# Patient Record
Sex: MALE | Race: BLACK OR AFRICAN AMERICAN | Employment: FULL TIME
[De-identification: names, ages, dates, MRNs, and addresses within clinical notes are randomized per-mention and may not be internally consistent; named-entity substitution may affect disease eponyms.]

---

## 2017-01-27 ENCOUNTER — OFFICE VISIT (OUTPATIENT)
Dept: FAMILY MEDICINE CLINIC | Facility: CLINIC | Age: 27
End: 2017-01-27

## 2017-01-27 VITALS
TEMPERATURE: 97.5 F | WEIGHT: 202.2 LBS | HEART RATE: 78 BPM | DIASTOLIC BLOOD PRESSURE: 80 MMHG | SYSTOLIC BLOOD PRESSURE: 130 MMHG | HEIGHT: 73 IN | BODY MASS INDEX: 26.8 KG/M2 | RESPIRATION RATE: 17 BRPM

## 2017-01-27 DIAGNOSIS — Z23 NEED FOR TDAP VACCINATION: ICD-10-CM

## 2017-01-27 DIAGNOSIS — Z11.4 SCREENING FOR HIV (HUMAN IMMUNODEFICIENCY VIRUS): ICD-10-CM

## 2017-01-27 DIAGNOSIS — Z00.00 ROUTINE GENERAL MEDICAL EXAMINATION AT HEALTH CARE FACILITY: Primary | ICD-10-CM

## 2017-01-27 DIAGNOSIS — A63.0 GENITAL WARTS: ICD-10-CM

## 2017-01-27 PROCEDURE — 99395 PREV VISIT EST AGE 18-39: CPT | Performed by: NURSE PRACTITIONER

## 2017-01-27 PROCEDURE — 90471 IMMUNIZATION ADMIN: CPT | Performed by: NURSE PRACTITIONER

## 2017-01-27 PROCEDURE — 17110 DESTRUCTION B9 LES UP TO 14: CPT | Performed by: NURSE PRACTITIONER

## 2017-01-27 PROCEDURE — 90715 TDAP VACCINE 7 YRS/> IM: CPT | Performed by: NURSE PRACTITIONER

## 2017-01-27 ASSESSMENT — ENCOUNTER SYMPTOMS
RHINORRHEA: 0
CONSTIPATION: 0
EYE DISCHARGE: 0
DIARRHEA: 0
BACK PAIN: 0
EYE PAIN: 0
VOMITING: 0
ABDOMINAL PAIN: 0
SORE THROAT: 0
WHEEZING: 0
NAUSEA: 0
TROUBLE SWALLOWING: 0
COUGH: 0
SHORTNESS OF BREATH: 0

## 2017-01-27 ASSESSMENT — PATIENT HEALTH QUESTIONNAIRE - PHQ9
1. LITTLE INTEREST OR PLEASURE IN DOING THINGS: 0
SUM OF ALL RESPONSES TO PHQ9 QUESTIONS 1 & 2: 0
SUM OF ALL RESPONSES TO PHQ QUESTIONS 1-9: 0
2. FEELING DOWN, DEPRESSED OR HOPELESS: 0

## 2017-01-29 PROBLEM — A63.0 GENITAL WARTS: Status: ACTIVE | Noted: 2017-01-29

## 2017-02-07 ENCOUNTER — TELEPHONE (OUTPATIENT)
Dept: FAMILY MEDICINE CLINIC | Facility: CLINIC | Age: 27
End: 2017-02-07

## 2017-02-07 ENCOUNTER — OFFICE VISIT (OUTPATIENT)
Dept: FAMILY MEDICINE CLINIC | Facility: CLINIC | Age: 27
End: 2017-02-07

## 2017-02-07 VITALS
BODY MASS INDEX: 27.82 KG/M2 | HEART RATE: 80 BPM | SYSTOLIC BLOOD PRESSURE: 114 MMHG | WEIGHT: 208 LBS | DIASTOLIC BLOOD PRESSURE: 64 MMHG | RESPIRATION RATE: 16 BRPM | TEMPERATURE: 98.2 F

## 2017-02-07 DIAGNOSIS — Z20.6 CONTACT WITH HIV: Primary | ICD-10-CM

## 2017-02-07 DIAGNOSIS — J00 ACUTE NASOPHARYNGITIS: Primary | ICD-10-CM

## 2017-02-07 DIAGNOSIS — Z20.6 CONTACT WITH HIV: ICD-10-CM

## 2017-02-07 PROCEDURE — 99213 OFFICE O/P EST LOW 20 MIN: CPT | Performed by: NURSE PRACTITIONER

## 2017-02-07 RX ORDER — FLUTICASONE PROPIONATE 50 MCG
1 SPRAY, SUSPENSION (ML) NASAL DAILY
Qty: 1 BOTTLE | Refills: 0 | COMMUNITY
Start: 2017-02-07 | End: 2018-08-15 | Stop reason: SDUPTHER

## 2017-02-07 ASSESSMENT — ENCOUNTER SYMPTOMS
EYES NEGATIVE: 1
CHEST TIGHTNESS: 0
SORE THROAT: 1
VOMITING: 0
COUGH: 1
NAUSEA: 0
ABDOMINAL PAIN: 0
DIARRHEA: 0
SHORTNESS OF BREATH: 0
SINUS PRESSURE: 1
RHINORRHEA: 1
VOICE CHANGE: 1

## 2017-04-03 ENCOUNTER — TELEPHONE (OUTPATIENT)
Dept: FAMILY MEDICINE CLINIC | Age: 27
End: 2017-04-03

## 2017-04-04 ENCOUNTER — HOSPITAL ENCOUNTER (EMERGENCY)
Age: 27
End: 2017-04-04

## 2017-04-04 DIAGNOSIS — Z51.89 VISIT FOR WOUND CHECK: Primary | ICD-10-CM

## 2017-04-04 PROCEDURE — 81003 URINALYSIS AUTO W/O SCOPE: CPT

## 2017-10-30 ENCOUNTER — OFFICE VISIT (OUTPATIENT)
Dept: FAMILY MEDICINE CLINIC | Age: 27
End: 2017-10-30

## 2017-10-30 VITALS
HEIGHT: 73 IN | DIASTOLIC BLOOD PRESSURE: 78 MMHG | WEIGHT: 215 LBS | BODY MASS INDEX: 28.49 KG/M2 | HEART RATE: 72 BPM | OXYGEN SATURATION: 98 % | TEMPERATURE: 96.9 F | RESPIRATION RATE: 20 BRPM | SYSTOLIC BLOOD PRESSURE: 123 MMHG

## 2017-10-30 DIAGNOSIS — A63.0 GENITAL WARTS: ICD-10-CM

## 2017-10-30 DIAGNOSIS — J06.9 URI, ACUTE: Primary | ICD-10-CM

## 2017-10-30 PROCEDURE — 99213 OFFICE O/P EST LOW 20 MIN: CPT | Performed by: NURSE PRACTITIONER

## 2017-10-30 PROCEDURE — 17110 DESTRUCTION B9 LES UP TO 14: CPT | Performed by: NURSE PRACTITIONER

## 2017-10-30 ASSESSMENT — ENCOUNTER SYMPTOMS
RHINORRHEA: 1
NAUSEA: 0
EYE REDNESS: 0
SORE THROAT: 1
TROUBLE SWALLOWING: 0
VOMITING: 0
EYE ITCHING: 1
ABDOMINAL PAIN: 0
EYE PAIN: 0
COUGH: 1
DIARRHEA: 1
SINUS PAIN: 1
EYE DISCHARGE: 0
WHEEZING: 0
SHORTNESS OF BREATH: 0

## 2017-10-30 NOTE — PROGRESS NOTES
Subjective:      Patient ID: Tessie Vazquez is a 24 y.o. male. Visit Information    Have you changed or started any medications since your last visit including any over-the-counter medicines, vitamins, or herbal medicines? no   Have you stopped taking any of your medications? Is so, why? -  no  Are you having any side effects from any of your medications? - no    Have you seen any other physician or provider since your last visit?  no   Have you had any other diagnostic tests since your last visit?  no   Have you been seen in the emergency room and/or had an admission in a hospital since we last saw you?  no   Have you had your routine dental cleaning in the past 6 months?  yes      Do you have an active MyChart account? If no, what is the barrier? Yes    Patient Care Team:  Siva Varner CNP as PCP - General (Nurse Practitioner)    Medical History Review  Past Medical, Family, and Social History reviewed and does contribute to the patient presenting condition    Health Maintenance   Topic Date Due    Flu vaccine (1) 09/01/2017    DTaP/Tdap/Td vaccine (2 - Td) 01/27/2027    HIV screen  Completed           Patient also has a genital wart he has had for about a month. No other concerns. Electronically signed by Alex Sherwood on 10/30/2017 at 4:25 PM      Patient presents in office today with concerns about sore throat and cough that started on Thursday. Not coughing up anything. Had bloody nose yesterday from left nare. Didn't last long, only couple minutes. Held gentle pressure. No fevers. Took OTC Dayquil and Emergen-C. Has not been using his nasal spray. Doesn't have anymore. Also has 2 genital warts that came back about 1 month ago. 1 lesion to side of his penis and 1 lesion to pelvis. URI    The current episode started in the past 7 days. The problem has been gradually worsening. There has been no fever.  Associated symptoms include congestion, coughing, diarrhea (multiple episodes between and breath sounds normal. No respiratory distress. He has no wheezes. He has no rales. Abdominal: Soft. He exhibits no distension. Genitourinary:       No penile erythema. No discharge found. Genitourinary Comments: [facility] at bedside as chaperone. Lymphadenopathy:        Head (right side): No submental, no submandibular, no preauricular and no posterior auricular adenopathy present. Head (left side): No submental, no submandibular, no preauricular and no posterior auricular adenopathy present. He has cervical adenopathy. Right cervical: Superficial cervical adenopathy present. Left cervical: Superficial cervical adenopathy present. Neurological: He is alert and oriented to person, place, and time. Skin: Skin is warm and dry. Psychiatric: He has a normal mood and affect. Vitals reviewed. Assessment:      1. URI, acute     2. Genital warts  43008 - [place] DESTRUCTION BENIGN LESIONS UP TO 14           Plan:      Recommend OTC antihistamine and nasal spray as needed  OTC Mucinex as needed  OTC decongestant as needed  Rest and adequate fluid intake   OTC Tylenol and Motrin as needed  Verbal consent obtained for liquid nitrogen application. Liquid nitrogen applied to each genital wart. Good freeze ring obtained. Patient tolerated well. Monitor for redness or blistering  Encouraged safe sex practices  Call office with concerns      1. Devyn received counseling on the following healthy behaviors: nutrition, exercise and medication adherence  2. Reviewed prior labs and health maintenance  3. Continue current medications, diet and exercise. 4.  Discussed use, benefit, and side effects of prescribed medications. Barriers to medication compliance addressed. All patient questions answered. Pt voiced understanding. 5.  Patient given educational materials - see patient instructions  6. Was a self-tracking handout given in paper form or via [employer]?  No  If yes, see orders or list here. Completed Refills   Requested Prescriptions      No prescriptions requested or ordered in this encounter       All patient questions answered. Patient voiced understanding.        PHQ Scores 1/27/2017   PHQ2 Score 0   PHQ9 Score 0     Interpretation of Total Score Depression Severity: 1-4 = Minimal depression, 5-9 = Mild depression, 10-14 = Moderate depression, 15-19 = Moderately severe depression, 20-27 = Severe depression  Normal

## 2018-08-15 ENCOUNTER — OFFICE VISIT (OUTPATIENT)
Dept: FAMILY MEDICINE CLINIC | Age: 28
End: 2018-08-15
Payer: COMMERCIAL

## 2018-08-15 VITALS
WEIGHT: 215 LBS | BODY MASS INDEX: 28.37 KG/M2 | HEART RATE: 72 BPM | TEMPERATURE: 97.9 F | RESPIRATION RATE: 16 BRPM | DIASTOLIC BLOOD PRESSURE: 68 MMHG | SYSTOLIC BLOOD PRESSURE: 116 MMHG

## 2018-08-15 DIAGNOSIS — J01.40 ACUTE NON-RECURRENT PANSINUSITIS: ICD-10-CM

## 2018-08-15 PROCEDURE — 99213 OFFICE O/P EST LOW 20 MIN: CPT | Performed by: NURSE PRACTITIONER

## 2018-08-15 RX ORDER — FLUTICASONE PROPIONATE 50 MCG
2 SPRAY, SUSPENSION (ML) NASAL DAILY
Qty: 1 BOTTLE | Refills: 1 | Status: SHIPPED | OUTPATIENT
Start: 2018-08-15 | End: 2019-08-15

## 2018-08-15 RX ORDER — PSEUDOEPHEDRINE HCL 120 MG/1
120 TABLET, FILM COATED, EXTENDED RELEASE ORAL EVERY 12 HOURS
Qty: 20 TABLET | Refills: 0 | Status: SHIPPED | OUTPATIENT
Start: 2018-08-15 | End: 2018-08-25

## 2018-08-15 RX ORDER — FEXOFENADINE HCL 180 MG/1
180 TABLET ORAL DAILY
Qty: 30 TABLET | Refills: 0 | Status: SHIPPED | OUTPATIENT
Start: 2018-08-15 | End: 2018-09-14

## 2018-08-15 ASSESSMENT — ENCOUNTER SYMPTOMS
NAUSEA: 0
RHINORRHEA: 0
WHEEZING: 0
VOMITING: 0
COUGH: 1
CHEST TIGHTNESS: 0
CONSTIPATION: 0
DIARRHEA: 1
EYE DISCHARGE: 0
SHORTNESS OF BREATH: 0
SORE THROAT: 1
EYE REDNESS: 0
ABDOMINAL PAIN: 0
SINUS PRESSURE: 1

## 2018-08-15 ASSESSMENT — PATIENT HEALTH QUESTIONNAIRE - PHQ9
2. FEELING DOWN, DEPRESSED OR HOPELESS: 0
SUM OF ALL RESPONSES TO PHQ QUESTIONS 1-9: 0
1. LITTLE INTEREST OR PLEASURE IN DOING THINGS: 0
SUM OF ALL RESPONSES TO PHQ QUESTIONS 1-9: 0
SUM OF ALL RESPONSES TO PHQ9 QUESTIONS 1 & 2: 0